# Patient Record
Sex: FEMALE | Race: BLACK OR AFRICAN AMERICAN
[De-identification: names, ages, dates, MRNs, and addresses within clinical notes are randomized per-mention and may not be internally consistent; named-entity substitution may affect disease eponyms.]

---

## 2021-02-23 ENCOUNTER — HOSPITAL ENCOUNTER (OUTPATIENT)
Dept: HOSPITAL 75 - PREOP | Age: 26
LOS: 3 days | Discharge: HOME | End: 2021-02-26
Attending: SURGERY
Payer: COMMERCIAL

## 2021-02-23 VITALS — BODY MASS INDEX: 22.24 KG/M2 | WEIGHT: 150.13 LBS | HEIGHT: 69.02 IN

## 2021-02-23 DIAGNOSIS — Z01.818: Primary | ICD-10-CM

## 2021-03-02 ENCOUNTER — HOSPITAL ENCOUNTER (OUTPATIENT)
Dept: HOSPITAL 75 - ENDO | Age: 26
Discharge: HOME | End: 2021-03-02
Attending: SURGERY
Payer: COMMERCIAL

## 2021-03-02 VITALS — DIASTOLIC BLOOD PRESSURE: 68 MMHG | SYSTOLIC BLOOD PRESSURE: 100 MMHG

## 2021-03-02 VITALS — DIASTOLIC BLOOD PRESSURE: 77 MMHG | SYSTOLIC BLOOD PRESSURE: 107 MMHG

## 2021-03-02 VITALS — WEIGHT: 150.13 LBS | HEIGHT: 69.02 IN | BODY MASS INDEX: 22.24 KG/M2

## 2021-03-02 VITALS — SYSTOLIC BLOOD PRESSURE: 102 MMHG | DIASTOLIC BLOOD PRESSURE: 90 MMHG

## 2021-03-02 VITALS — DIASTOLIC BLOOD PRESSURE: 63 MMHG | SYSTOLIC BLOOD PRESSURE: 104 MMHG

## 2021-03-02 VITALS — SYSTOLIC BLOOD PRESSURE: 94 MMHG | DIASTOLIC BLOOD PRESSURE: 61 MMHG

## 2021-03-02 DIAGNOSIS — K92.1: Primary | ICD-10-CM

## 2021-03-02 DIAGNOSIS — K60.2: ICD-10-CM

## 2021-03-02 DIAGNOSIS — K21.00: ICD-10-CM

## 2021-03-02 DIAGNOSIS — Z79.899: ICD-10-CM

## 2021-03-02 DIAGNOSIS — K59.09: ICD-10-CM

## 2021-03-02 DIAGNOSIS — K21.9: ICD-10-CM

## 2021-03-02 PROCEDURE — 84703 CHORIONIC GONADOTROPIN ASSAY: CPT

## 2021-03-02 PROCEDURE — 88305 TISSUE EXAM BY PATHOLOGIST: CPT

## 2021-03-02 NOTE — OPERATIVE REPORT
DATE OF SERVICE:  03/02/2021



PREOPERATIVE DIAGNOSES:

Chronic constipation, gastroesophageal reflux disease, melena.



POSTOPERATIVE DIAGNOSES:

Normal EGD, colon polyps versus inflammatory bowel changes, posterior anal

fissure.



PROCEDURE:

EGD with biopsies, colonoscopy with cold biopsy of the ileum and hot biopsy

polypectomy x6 and cold biopsy of the rectum x3.



SURGEON:

Dhiraj Ames DO



ANESTHESIA:

Per CRNA.



ESTIMATED BLOOD LOSS:

Minimal.



COMPLICATIONS:

None.



INDICATIONS:

The patient is a 25-year-old female who has had longstanding bowel issues.  She

has been having chronic constipation, GERD and some melena.  She understands

risks and benefits of procedure and wished to proceed with procedure.  Consent

was signed in the chart.



DESCRIPTION OF PROCEDURE:

The patient was taken to the endoscopy suite, placed in left lateral recumbent

position.  Timeout was performed.  Scope was inserted in mouth, down the

esophagus, stomach and into the duodenum without difficulty.  There were no

polyps, masses or ulcerations within the duodenum.  Scope was slowly retracted

back into the stomach where it was further insufflated.  No polyps, masses or

ulcerations.  Biopsy of the antrum was obtained.  Scope was retroflexed noting

no other pathology.  Scope was returned to its normal position, slowly withdrawn

to distal esophagus.  Biopsy of the GE junction was obtained.  Scope was then

slowly retracted back until completely removed, noting no other pathology. 

Digital rectal exam was performed.  A sentinel pile present.  A small anal

fissure very superficial present.  No palpable polyps, masses or ulcerations. 

Scope was inserted in the rectum and advanced all the way to the cecum with

minimal difficulty.  Prep was adequate with irrigation and suction.  Scope was

then slowly retracted back.  Scope was inserted through the ileocecal valve, the

ileum was noted and had slightly thickened appearance of the mucosa.  Cold

biopsies were obtained.  Scope retracted back into the stomach.  The cecum had

two small polyps, which hot biopsy polypectomy was performed.  Scope was then

continuously retracted back.  No polyps, masses or ulcerations within the

ascending or transverse colon.  In the descending, two other small polyps were

present, which hot biopsy polypectomy was performed.  Scope was continuously

retracted back to the sigmoid where two small polyps were present, which hot

biopsy polypectomy was performed.  Scope was then continuously retracted back

into the rectum where multiple raised, slightly polyp appearing lesions were

present with ulceration present within them.  Three cold biopsies of these areas

were obtained.  Scope was retroflexed noting no other pathology.  Scope was

returned to its normal position, slowly withdrawn until completely removed.  The

patient tolerated procedure well without any complications.  She was taken to

recovery room in stable condition.



RECOMMENDATIONS:

The patient to continue on current medications.  We will have her follow up on

biopsies  in the office.  Any issues before that be seen at that time. 

Further repeat colonoscopy recommendations pending biopsy results.





Job ID: [id number]

DocumentID: [id number]

Dictated Date:  03/02/2021 09:20:25

Transcription Date: 03/02/2021 13:45:29

Dictated By: DO MADINA JENKINS

## 2021-03-02 NOTE — PROGRESS NOTE-POST OPERATIVE
Post-Operative Progess Note


Surgeon (s)/Assistant (s)


Surgeon


COLLIN GRAYSON DO


Assistant:  na





Pre-Operative Diagnosis


chronic constipation, gerd, melena





Post-Operative Diagnosis





normal egd, colon polyps versus inflammatory bowel, posterior anal fissure





Procedure & Operative Findings


Date of Procedure


3/2/21


Procedure Performed/Findings


egd c biopsies, colonoscopy c cold biopsies ileum, hot bx polypectomy x 6 and 

cold biopsies rectum x 3


Anesthesia Type


per crna





Estimated Blood Loss


Estimated blood loss (mL):  minimal





Specimens/Packing


Specimens Removed


antrum,  ge, ileum, colon polyps, rectal biopsies











COLLIN GRAYSON DO               Mar 2, 2021 09:14

## 2021-03-02 NOTE — DISCHARGE INST-SIMPLE/STANDARD
Discharge Inst-Standard


Patient Instructions/Follow Up


Plan of Care/Instructions/FU:  


2 weeks Hui


Activity as Tolerated:  Yes


Discharge Diet:  Regular Diet











COLLIN GRAYSON DO               Mar 2, 2021 09:16

## 2021-03-02 NOTE — PROGRESS NOTE-PRE OPERATIVE
Pre-Operative Progress Note


H&P Reviewed


The H&P was reviewed, patient examined and no changes noted.


Date Seen by Provider:  Mar 2, 2021


Time Seen by Provider:  08:20


Date H&P Reviewed:  Mar 2, 2021


Time H&P Reviewed:  08:20


Pre-Operative Diagnosis:  chronic constipation, gerd, melena











COLLIN GRAYSON DO               Mar 2, 2021 08:21

## 2021-03-04 NOTE — ANESTHESIA-GENERAL POST-OP
MAC


Significant Intra-Op Events


Notes


postop addendum for mac anesthesia on 3/2/21 at 0930





Patient Condition


Mental Status/LOC:  Same as Preop


Cardiovascular:  Satisfactory


Nausea/Vomiting:  Absent


Respiratory:  Satisfactory


Pain:  Controlled


Complications:  Absent





Post Op Complications


Complications


None





Follow Up Care/Instructions


Patient Instructions


None needed.





Anesthesiology Discharge Order


Discharge Order


Patient is doing well, no complaints, stable vital signs, no apparent adverse 

anesthesia problems.   


No complications reported per nursing.











NATY DURHAM CRNA               Mar 4, 2021 12:34